# Patient Record
Sex: FEMALE | Race: BLACK OR AFRICAN AMERICAN | NOT HISPANIC OR LATINO | URBAN - METROPOLITAN AREA
[De-identification: names, ages, dates, MRNs, and addresses within clinical notes are randomized per-mention and may not be internally consistent; named-entity substitution may affect disease eponyms.]

---

## 2021-12-29 ENCOUNTER — EMERGENCY (EMERGENCY)
Facility: HOSPITAL | Age: 21
LOS: 0 days | Discharge: HOME | End: 2021-12-29
Attending: EMERGENCY MEDICINE | Admitting: EMERGENCY MEDICINE
Payer: COMMERCIAL

## 2021-12-29 VITALS
HEART RATE: 92 BPM | RESPIRATION RATE: 18 BRPM | DIASTOLIC BLOOD PRESSURE: 82 MMHG | OXYGEN SATURATION: 100 % | TEMPERATURE: 99 F | SYSTOLIC BLOOD PRESSURE: 143 MMHG

## 2021-12-29 VITALS
WEIGHT: 229.28 LBS | SYSTOLIC BLOOD PRESSURE: 154 MMHG | HEIGHT: 67 IN | DIASTOLIC BLOOD PRESSURE: 88 MMHG | TEMPERATURE: 100 F | HEART RATE: 120 BPM | RESPIRATION RATE: 22 BRPM | OXYGEN SATURATION: 100 %

## 2021-12-29 DIAGNOSIS — R07.2 PRECORDIAL PAIN: ICD-10-CM

## 2021-12-29 DIAGNOSIS — R00.0 TACHYCARDIA, UNSPECIFIED: ICD-10-CM

## 2021-12-29 DIAGNOSIS — M54.2 CERVICALGIA: ICD-10-CM

## 2021-12-29 DIAGNOSIS — F41.9 ANXIETY DISORDER, UNSPECIFIED: ICD-10-CM

## 2021-12-29 DIAGNOSIS — U07.1 COVID-19: ICD-10-CM

## 2021-12-29 PROCEDURE — 93010 ELECTROCARDIOGRAM REPORT: CPT | Mod: 76

## 2021-12-29 PROCEDURE — 71045 X-RAY EXAM CHEST 1 VIEW: CPT | Mod: 26

## 2021-12-29 PROCEDURE — 99284 EMERGENCY DEPT VISIT MOD MDM: CPT

## 2021-12-29 NOTE — ED PROVIDER NOTE - CLINICAL SUMMARY MEDICAL DECISION MAKING FREE TEXT BOX
Patient presents with chest pain. ekg, cxr done. no acute findings. Patient feeling better. Results discussed. Upreg negative. Discharged with pmd and cardiology follow up. Return precautions.

## 2021-12-29 NOTE — ED PROVIDER NOTE - ATTENDING CONTRIBUTION TO CARE
22 yo F no pmh presnts with chest pain. States that over the last week she has been having some more anxiety and stress in her life. States that last night while at rest developed an episode of cehst pain and shortness of breath that lasted for 40 minutes then resolved. Was ok all day but at work had another episode while at rest so came in for evaluation. no palpitations. no fever or recent illness. no leg swelling or pain. no recent surgery or travel. no hx of clots. + OCP use. pmd is Dr. Julien.     CONSTITUTIONAL: Well-developed; well-nourished; in no acute distress.   SKIN: warm, dry  HEAD: Normocephalic; atraumatic.  EYES: PERRL, EOMI, no conjunctival erythema  ENT: No nasal discharge; airway clear.  NECK: Supple; non tender.  CARD: S1, S2 normal;  Regular rate and rhythm.   RESP: No wheezes, rales or rhonchi.  ABD: soft non tender, non distended, no rebound or guarding  EXT: Normal ROM.  5/5 strength in all 4 extremities. no pedal edema of calf tenderness.   LYMPH: No acute cervical adenopathy.  NEURO: Alert, oriented, grossly unremarkable. neurovascularly intact  PSYCH: Cooperative, appropriate.

## 2021-12-29 NOTE — ED ADULT TRIAGE NOTE - CHIEF COMPLAINT QUOTE
Patient has chest pain radiating midsternal to back, SOB, and frequent panic attacks as per a this month.

## 2021-12-29 NOTE — ED PROVIDER NOTE - OBJECTIVE STATEMENT
21yoF w/o pmhx who present with 2 episodes of chest pain. 1 episode last night while watching tv lasted 40min and resolved on its own. she had difficulty breathing, aching CP substernal radiating to neck. she felt anxious during that time. her father passed away few months ago and she has been having anxiety since then. today while at work and not doing strenuous activity she developed same sxs again. no nausea vomiting ab pain urinary or fecal sxs. she is on OCP.  LMP 2 weeks ago. no hx or fhx of blood clots. no leg swelling or pain. does not smoke.

## 2021-12-29 NOTE — ED PROVIDER NOTE - PHYSICAL EXAMINATION
CONSTITUTIONAL: well-developed, well-nourished, in no acute distress  SKIN: warm, dry  HEAD: Normocephalic  EYES: no conjunctival erythema  ENT: no nasal discharge, airway clear  NECK: full ROM, non-tender  CARD: tachycardic and regular  RESP: normal respiratory effort, no wheezes, rales or rhonchi  ABD: soft, non-distended, non-tender  EXT: moving all extremities spontaneously, no calf tenderness or swelling  NEURO: alert and oriented, grossly unremarkable  PSYCH: cooperative, appropriate, anxious appearing

## 2021-12-29 NOTE — ED PROVIDER NOTE - NSFOLLOWUPINSTRUCTIONS_ED_ALL_ED_FT
Nonspecific Chest Pain  ImageChest pain can be caused by many different conditions. There is always a chance that your pain could be related to something serious, such as a heart attack or a blood clot in your lungs. Chest pain can also be caused by conditions that are not life-threatening. If you have chest pain, it is very important to follow up with your health care provider.    What are the causes?  Causes of this condition include:    Heartburn.  Pneumonia or bronchitis.  Anxiety or stress.  Inflammation around your heart (pericarditis) or lung (pleuritis or pleurisy).  A blood clot in your lung.  A collapsed lung (pneumothorax). This can develop suddenly on its own (spontaneous pneumothorax) or from trauma to the chest.  Shingles infection (varicella-zoster virus).  Heart attack.  Damage to the bones, muscles, and cartilage that make up your chest wall. This can include:    Bruised bones due to injury.  Strained muscles or cartilage due to frequent or repeated coughing or overwork.  Fracture to one or more ribs.  Sore cartilage due to inflammation (costochondritis).      What increases the risk?  Risk factors for this condition may include:    Activities that increase your risk for trauma or injury to your chest.  Respiratory infections or conditions that cause frequent coughing.  Medical conditions or overeating that can cause heartburn.  Heart disease or family history of heart disease.  Conditions or health behaviors that increase your risk of developing a blood clot.  Having had chicken pox (varicella zoster).    What are the signs or symptoms?  Chest pain can feel like:    Burning or tingling on the surface of your chest or deep in your chest.  Crushing, pressure, aching, or squeezing pain.  Dull or sharp pain that is worse when you move, cough, or take a deep breath.  Pain that is also felt in your back, neck, shoulder, or arm, or pain that spreads to any of these areas.    Your chest pain may come and go, or it may stay constant.    How is this diagnosed?  Lab tests or other studies may be needed to find the cause of your pain. Your health care provider may have you take a test called an ECG (electrocardiogram). An ECG records your heartbeat patterns at the time the test is performed. You may also have other tests, such as:    Transthoracic echocardiogram (TTE). In this test, sound waves are used to create a picture of the heart structures and to look at how blood flows through your heart.  Transesophageal echocardiogram (JOSE). This is a more advanced imaging test that takes images from inside your body. It allows your health care provider to see your heart in finer detail.  Cardiac monitoring. This allows your health care provider to monitor your heart rate and rhythm in real time.  Holter monitor. This is a portable device that records your heartbeat and can help to diagnose abnormal heartbeats. It allows your health care provider to track your heart activity for several days, if needed.  Stress tests. These can be done through exercise or by taking medicine that makes your heart beat more quickly.  Blood tests.  Other imaging tests.    How is this treated?  Treatment depends on what is causing your chest pain. Treatment may include:    Medicines. These may include:    Acid blockers for heartburn.  Anti-inflammatory medicine.  Pain medicine for inflammatory conditions.  Antibiotic medicine, if an infection is present.  Medicines to dissolve blood clots.  Medicines to treat coronary artery disease (CAD).    Supportive care for conditions that do not require medicines. This may include:    Resting.  Applying heat or cold packs to injured areas.  Limiting activities until pain decreases.      Follow these instructions at home:  Medicines     If you were prescribed an antibiotic, take it as told by your health care provider. Do not stop taking the antibiotic even if you start to feel better.  Take over-the-counter and prescription medicines only as told by your health care provider.  Lifestyle     Do not use any products that contain nicotine or tobacco, such as cigarettes and e-cigarettes. If you need help quitting, ask your health care provider.  Do not drink alcohol.  ImageMake lifestyle changes as directed by your health care provider. These may include:    Getting regular exercise. Ask your health care provider to suggest some activities that are safe for you.  Eating a heart-healthy diet. A registered dietitian can help you to learn healthy eating options.  Maintaining a healthy weight.  Managing diabetes, if necessary.  Reducing stress, such as with yoga or relaxation techniques.    General instructions     Avoid any activities that bring on chest pain.  If heartburn is the cause for your chest pain, raise (elevate) the head of your bed about 6 inches (15 cm) by putting blocks under the legs. Sleeping with more pillows does not effectively relieve heartburn because it only changes the position of your head.  Keep all follow-up visits as told by your health care provider. This is important. This includes any further testing if your chest pain does not go away.  Contact a health care provider if:  Your chest pain does not go away.  You have a rash with blisters on your chest.  You have a fever.  You have chills.  Get help right away if:  Your chest pain is worse.  You have a cough that gets worse, or you cough up blood.  You have severe pain in your abdomen.  You have severe weakness.  You faint.  You have sudden, unexplained chest discomfort.  You have sudden, unexplained discomfort in your arms, back, neck, or jaw.  You have shortness of breath at any time.  You suddenly start to sweat, or your skin gets clammy.  You feel nauseous or you vomit.  You suddenly feel light-headed or dizzy.  Your heart begins to beat quickly, or it feels like it is skipping beats.  These symptoms may represent a serious problem that is an emergency. Do not wait to see if the symptoms will go away. Get medical help right away. Call your local emergency services (911 in the [place] Do not drive yourself to the hospital.     This information is not intended to replace advice given to you by your health care provider. Make sure you discuss any questions you have with your health care provider.      Anxiety    Generalized anxiety disorder (ANDRA) is a mental disorder. It is defined as anxiety that is not necessarily related to specific events or activities or is out of proportion to said events. Symptoms include restlessness, fatigue, difficulty concentrations, irritability and difficulty concentrating. It interferes with life functions, including relationships, work, and school. If you were started on a medication make sure to take exactly as prescribed and follow up with a psychiatrist.    SEEK IMMEDIATE MEDICAL CARE IF YOU HAVE THE FOLLOWING SYMPTOMS: thoughts about hurting killing yourself, thoughts about hurting or killing somebody else, hallucinations, or worsening depression.

## 2021-12-29 NOTE — ED PROVIDER NOTE - NS ED ROS FT
Constitutional: No fever   Eyes:  No visual changes  Ears:  No hearing changes  Neck: No neck pain  Cardiac:  +chest pain  Respiratory:  +SOB   GI:  No abdominal pain, nausea, or vomiting  :  No dysuria  MS:  No back pain  Neuro:  No headache or weakness.  No LOC  Skin:  No skin rash  Psychiatry: +anxious

## 2021-12-29 NOTE — ED PROVIDER NOTE - PATIENT PORTAL LINK FT
You can access the FollowMyHealth Patient Portal offered by [facility] by registering at the following website: [email or web address]. By joining [facility]’s FollowMyHealth portal, you will also be able to view your health information using other applications (apps) compatible with our system.

## 2021-12-29 NOTE — ED PROVIDER NOTE - NSFOLLOWUPCLINICS_GEN_ALL_ED_FT
[facility]  Cardiology  [address], [address]  [place] [address]  Phone: [phone]  Fax: [phone]  Follow Up Time: 1-3 Days    [facility]  [facility]  [address] [place] [address]  Phone: [phone]  Fax:   Follow Up Time: 1-3 Days

## 2021-12-30 LAB — SARS-COV-2 RNA SPEC QL NAA+PROBE: DETECTED

## 2024-01-09 ENCOUNTER — APPOINTMENT (OUTPATIENT)
Dept: OBGYN | Facility: CLINIC | Age: 24
End: 2024-01-09